# Patient Record
Sex: FEMALE | Race: WHITE | NOT HISPANIC OR LATINO | Employment: OTHER | URBAN - METROPOLITAN AREA
[De-identification: names, ages, dates, MRNs, and addresses within clinical notes are randomized per-mention and may not be internally consistent; named-entity substitution may affect disease eponyms.]

---

## 2021-04-13 ENCOUNTER — NEW PATIENT COMPREHENSIVE (OUTPATIENT)
Dept: URBAN - METROPOLITAN AREA CLINIC 36 | Facility: CLINIC | Age: 66
End: 2021-04-13

## 2021-04-13 DIAGNOSIS — Z96.1: ICD-10-CM

## 2021-04-13 DIAGNOSIS — H40.1134: ICD-10-CM

## 2021-04-13 PROCEDURE — 92004 COMPRE OPH EXAM NEW PT 1/>: CPT

## 2021-04-13 ASSESSMENT — TONOMETRY
OD_IOP_MMHG: 18
OS_IOP_MMHG: 18

## 2021-04-13 ASSESSMENT — VISUAL ACUITY
OD_SC: 20/50
OD_CC: J1
OD_CC: 20/40+2
OS_CC: J3
OS_SC: 20/70-1
OS_SC: J>10
OD_SC: J8
OS_CC: 20/40

## 2021-05-13 ENCOUNTER — FOLLOW UP (OUTPATIENT)
Dept: URBAN - METROPOLITAN AREA CLINIC 36 | Facility: CLINIC | Age: 66
End: 2021-05-13

## 2021-05-13 DIAGNOSIS — H40.1133: ICD-10-CM

## 2021-05-13 PROCEDURE — 92083 EXTENDED VISUAL FIELD XM: CPT

## 2021-05-13 PROCEDURE — 92012 INTRM OPH EXAM EST PATIENT: CPT

## 2021-05-13 PROCEDURE — 92133 CPTRZD OPH DX IMG PST SGM ON: CPT

## 2021-05-13 RX ORDER — BIMATOPROST 0.1 MG/ML: 1 SOLUTION/ DROPS OPHTHALMIC EVERY EVENING

## 2021-05-13 ASSESSMENT — VISUAL ACUITY
OS_CC: 20/70-1
OS_PH: 20/60
OD_CC: 20/40-2

## 2021-05-13 ASSESSMENT — TONOMETRY
OD_IOP_MMHG: 20
OS_IOP_MMHG: 18

## 2021-05-25 ENCOUNTER — IOP CHECK (OUTPATIENT)
Dept: URBAN - METROPOLITAN AREA CLINIC 36 | Facility: CLINIC | Age: 66
End: 2021-05-25

## 2021-05-25 DIAGNOSIS — H40.1133: ICD-10-CM

## 2021-05-25 DIAGNOSIS — Z98.890: ICD-10-CM

## 2021-05-25 PROCEDURE — 92012 INTRM OPH EXAM EST PATIENT: CPT

## 2021-05-25 RX ORDER — BRIMONIDINE TARTRATE, TIMOLOL MALEATE 2; 5 MG/ML; MG/ML: 1 SOLUTION/ DROPS OPHTHALMIC TWICE A DAY

## 2021-05-25 ASSESSMENT — VISUAL ACUITY
OS_PH: 20/60-1
OS_CC: 20/70-2
OD_CC: 20/40-2

## 2021-05-25 ASSESSMENT — TONOMETRY
OD_IOP_MMHG: 16
OS_IOP_MMHG: 19
OD_IOP_MMHG: 17
OS_IOP_MMHG: 15

## 2021-06-01 ENCOUNTER — RX CHANGE - NO APPT (OUTPATIENT)
Dept: URBAN - METROPOLITAN AREA CLINIC 36 | Facility: CLINIC | Age: 66
End: 2021-06-01

## 2021-06-23 ENCOUNTER — RETINA CONSULT (OUTPATIENT)
Dept: URBAN - METROPOLITAN AREA CLINIC 36 | Facility: CLINIC | Age: 66
End: 2021-06-23

## 2021-06-23 DIAGNOSIS — E10.3593: ICD-10-CM

## 2021-06-23 PROCEDURE — 92250 FUNDUS PHOTOGRAPHY W/I&R: CPT

## 2021-06-23 PROCEDURE — 99214 OFFICE O/P EST MOD 30 MIN: CPT

## 2021-06-23 ASSESSMENT — VISUAL ACUITY
OS_SC: 20/100
OS_PH: 20/70-2
OD_SC: 20/40-2

## 2021-06-23 ASSESSMENT — TONOMETRY
OD_IOP_MMHG: 18
OS_IOP_MMHG: 18

## 2021-07-30 ENCOUNTER — GLAUCOMA EVAL (OUTPATIENT)
Dept: URBAN - METROPOLITAN AREA CLINIC 46 | Facility: CLINIC | Age: 66
End: 2021-07-30

## 2021-07-30 DIAGNOSIS — H40.1134: ICD-10-CM

## 2021-07-30 DIAGNOSIS — E10.3593: ICD-10-CM

## 2021-07-30 PROCEDURE — 92250 FUNDUS PHOTOGRAPHY W/I&R: CPT

## 2021-07-30 PROCEDURE — 76514 ECHO EXAM OF EYE THICKNESS: CPT

## 2021-07-30 PROCEDURE — 92014 COMPRE OPH EXAM EST PT 1/>: CPT

## 2021-07-30 PROCEDURE — 92020 GONIOSCOPY: CPT

## 2021-07-30 ASSESSMENT — VISUAL ACUITY
OD_CC: J2
OD_CC: 20/40-2
OS_CC: J2
OS_CC: 20/40-1

## 2021-07-30 ASSESSMENT — PACHYMETRY
OD_CT_UM: 647
OS_CT_UM: 640

## 2021-07-30 ASSESSMENT — TONOMETRY
OS_IOP_MMHG: 18
OD_IOP_MMHG: 19

## 2021-09-02 NOTE — PATIENT DISCUSSION
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 OCT normal OS; VF OS shows possible early arcuate defect.

## 2021-11-17 NOTE — PATIENT DISCUSSION
Monitor. [Follow - Up] : a follow-up visit [DM Type 2] : DM Type 2 [Hypothyroidism] : hypothyroidism [Thyroid nodule/ MNG] : thyroid nodule/ MNG

## 2022-02-01 ENCOUNTER — ESTABLISHED PATIENT (OUTPATIENT)
Dept: URBAN - METROPOLITAN AREA CLINIC 36 | Facility: CLINIC | Age: 67
End: 2022-02-01

## 2022-02-01 DIAGNOSIS — Z98.890: ICD-10-CM

## 2022-02-01 DIAGNOSIS — H40.1134: ICD-10-CM

## 2022-02-01 PROCEDURE — 92012 INTRM OPH EXAM EST PATIENT: CPT

## 2022-02-01 ASSESSMENT — VISUAL ACUITY
OS_CC: J2
OD_CC: J2
OD_CC: 20/40-1
OU_CC: J2
OS_CC: 20/40-2
OU_CC: 20/40

## 2022-02-01 ASSESSMENT — TONOMETRY
OS_IOP_MMHG: 20
OS_IOP_MMHG: 18
OD_IOP_MMHG: 18

## 2022-05-06 ENCOUNTER — COMPREHENSIVE EXAM (OUTPATIENT)
Dept: URBAN - METROPOLITAN AREA CLINIC 36 | Facility: CLINIC | Age: 67
End: 2022-05-06

## 2022-05-06 DIAGNOSIS — H35.30: ICD-10-CM

## 2022-05-06 DIAGNOSIS — E10.3593: ICD-10-CM

## 2022-05-06 DIAGNOSIS — Z98.890: ICD-10-CM

## 2022-05-06 DIAGNOSIS — Z96.1: ICD-10-CM

## 2022-05-06 DIAGNOSIS — H52.7: ICD-10-CM

## 2022-05-06 DIAGNOSIS — H40.1134: ICD-10-CM

## 2022-05-06 PROCEDURE — 92083 EXTENDED VISUAL FIELD XM: CPT

## 2022-05-06 PROCEDURE — 92133 CPTRZD OPH DX IMG PST SGM ON: CPT

## 2022-05-06 PROCEDURE — 92014 COMPRE OPH EXAM EST PT 1/>: CPT

## 2022-05-06 PROCEDURE — 92015 DETERMINE REFRACTIVE STATE: CPT

## 2022-05-06 ASSESSMENT — VISUAL ACUITY
OD_SC: 20/40-1
OS_CC: 20/60-1
OU_CC: 20/40
OU_SC: 20/40-2
OD_PH: 20/40+2
OS_PH: 20/50-1
OS_SC: 20/80-1
OD_CC: J2
OU_SC: J12
OD_CC: 20/40+2
OD_SC: J12
OS_CC: J4
OU_CC: J2
OS_SC: J12

## 2022-05-06 ASSESSMENT — TONOMETRY
OS_IOP_MMHG: 19
OD_IOP_MMHG: 18

## 2022-12-15 ENCOUNTER — CONSULTATION/EVALUATION (OUTPATIENT)
Dept: URBAN - METROPOLITAN AREA CLINIC 43 | Facility: CLINIC | Age: 67
End: 2022-12-15

## 2022-12-15 PROCEDURE — 92134 CPTRZ OPH DX IMG PST SGM RTA: CPT

## 2022-12-15 PROCEDURE — 92004 COMPRE OPH EXAM NEW PT 1/>: CPT

## 2022-12-15 ASSESSMENT — VISUAL ACUITY
OS_CC: 20/30
OD_CC: J3
OD_CC: 20/30-2
OS_CC: J6

## 2022-12-15 ASSESSMENT — TONOMETRY
OS_IOP_MMHG: 19
OD_IOP_MMHG: 19

## 2023-04-20 ENCOUNTER — COMPREHENSIVE EXAM (OUTPATIENT)
Dept: URBAN - METROPOLITAN AREA CLINIC 36 | Facility: CLINIC | Age: 68
End: 2023-04-20

## 2023-04-20 DIAGNOSIS — H40.1134: ICD-10-CM

## 2023-04-20 DIAGNOSIS — Z98.890: ICD-10-CM

## 2023-04-20 DIAGNOSIS — E10.3593: ICD-10-CM

## 2023-04-20 DIAGNOSIS — Z96.1: ICD-10-CM

## 2023-04-20 DIAGNOSIS — H52.7: ICD-10-CM

## 2023-04-20 PROCEDURE — 92015 DETERMINE REFRACTIVE STATE: CPT

## 2023-04-20 PROCEDURE — 92014 COMPRE OPH EXAM EST PT 1/>: CPT

## 2023-04-20 PROCEDURE — 92133 CPTRZD OPH DX IMG PST SGM ON: CPT

## 2023-04-20 ASSESSMENT — VISUAL ACUITY
OD_SC: 20/50-2
OD_PH: 20/25-2
OS_CC: J5
OS_CC: 20/50
OD_CC: 20/40+2
OS_SC: 20/100-1
OD_CC: J2+1

## 2023-04-20 ASSESSMENT — TONOMETRY
OD_IOP_MMHG: 15
OS_IOP_MMHG: 15

## 2023-11-21 ENCOUNTER — FOLLOW UP (OUTPATIENT)
Dept: URBAN - METROPOLITAN AREA CLINIC 36 | Facility: CLINIC | Age: 68
End: 2023-11-21

## 2023-11-21 DIAGNOSIS — H40.1134: ICD-10-CM

## 2023-11-21 PROCEDURE — 92012 INTRM OPH EXAM EST PATIENT: CPT

## 2023-11-21 PROCEDURE — 92083 EXTENDED VISUAL FIELD XM: CPT

## 2023-11-21 ASSESSMENT — TONOMETRY
OD_IOP_MMHG: 18
OS_IOP_MMHG: 19

## 2023-11-21 ASSESSMENT — VISUAL ACUITY
OS_CC: 20/50
OD_CC: 20/40-1

## 2024-01-05 ENCOUNTER — FOLLOW UP (OUTPATIENT)
Dept: URBAN - METROPOLITAN AREA CLINIC 36 | Facility: CLINIC | Age: 69
End: 2024-01-05

## 2024-01-05 DIAGNOSIS — H52.7: ICD-10-CM

## 2024-01-05 PROCEDURE — 92015 DETERMINE REFRACTIVE STATE: CPT

## 2024-01-05 ASSESSMENT — VISUAL ACUITY
OS_CC: 20/60-1
OD_CC: J2
OS_PH: 20/50-1
OD_CC: 20/30-1
OS_CC: J6

## 2024-05-10 ENCOUNTER — COMPREHENSIVE EXAM (OUTPATIENT)
Dept: URBAN - METROPOLITAN AREA CLINIC 36 | Facility: CLINIC | Age: 69
End: 2024-05-10

## 2024-05-10 DIAGNOSIS — Z96.1: ICD-10-CM

## 2024-05-10 DIAGNOSIS — E10.3593: ICD-10-CM

## 2024-05-10 DIAGNOSIS — Z98.890: ICD-10-CM

## 2024-05-10 DIAGNOSIS — H40.1134: ICD-10-CM

## 2024-05-10 PROCEDURE — 92133 CPTRZD OPH DX IMG PST SGM ON: CPT

## 2024-05-10 PROCEDURE — 92014 COMPRE OPH EXAM EST PT 1/>: CPT

## 2024-05-10 PROCEDURE — 92250 FUNDUS PHOTOGRAPHY W/I&R: CPT

## 2024-05-10 ASSESSMENT — VISUAL ACUITY
OD_SC: J12
OS_SC: J>12
OS_CC: J8
OD_SC: 20/40
OS_CC: 20/50-2
OD_CC: 20/30-2
OS_SC: 20/100+1
OD_CC: J3

## 2024-05-10 ASSESSMENT — TONOMETRY
OD_IOP_MMHG: 16
OS_IOP_MMHG: 16

## 2024-11-08 ENCOUNTER — FOLLOW UP (OUTPATIENT)
Dept: URBAN - METROPOLITAN AREA CLINIC 36 | Facility: CLINIC | Age: 69
End: 2024-11-08

## 2024-11-08 DIAGNOSIS — H53.453: ICD-10-CM

## 2024-11-08 DIAGNOSIS — H40.1134: ICD-10-CM

## 2024-11-08 PROCEDURE — 92012 INTRM OPH EXAM EST PATIENT: CPT

## 2024-11-08 PROCEDURE — 92083 EXTENDED VISUAL FIELD XM: CPT

## 2025-03-04 ENCOUNTER — FOLLOW UP (OUTPATIENT)
Age: 70
End: 2025-03-04

## 2025-03-04 DIAGNOSIS — E10.3593: ICD-10-CM

## 2025-03-04 DIAGNOSIS — Z96.1: ICD-10-CM

## 2025-03-04 DIAGNOSIS — H53.453: ICD-10-CM

## 2025-03-04 DIAGNOSIS — H40.1134: ICD-10-CM

## 2025-03-04 DIAGNOSIS — Z98.890: ICD-10-CM

## 2025-03-04 PROCEDURE — 92012 INTRM OPH EXAM EST PATIENT: CPT

## 2025-03-25 ENCOUNTER — PREPPED CHART (OUTPATIENT)
Age: 70
End: 2025-03-25

## 2025-04-08 ENCOUNTER — CONSULTATION/EVALUATION (OUTPATIENT)
Age: 70
End: 2025-04-08

## 2025-04-08 DIAGNOSIS — H04.123: ICD-10-CM

## 2025-04-08 DIAGNOSIS — E10.3593: ICD-10-CM

## 2025-04-08 DIAGNOSIS — H53.453: ICD-10-CM

## 2025-04-08 DIAGNOSIS — H18.513: ICD-10-CM

## 2025-04-08 DIAGNOSIS — Z96.1: ICD-10-CM

## 2025-04-08 DIAGNOSIS — Z98.890: ICD-10-CM

## 2025-04-08 PROCEDURE — 92025 CPTRIZED CORNEAL TOPOGRAPHY: CPT | Mod: NC

## 2025-04-08 PROCEDURE — 99214 OFFICE O/P EST MOD 30 MIN: CPT

## 2025-04-08 PROCEDURE — 92134 CPTRZ OPH DX IMG PST SGM RTA: CPT
